# Patient Record
Sex: MALE | Race: BLACK OR AFRICAN AMERICAN | Employment: OTHER | URBAN - METROPOLITAN AREA
[De-identification: names, ages, dates, MRNs, and addresses within clinical notes are randomized per-mention and may not be internally consistent; named-entity substitution may affect disease eponyms.]

---

## 2018-09-01 ENCOUNTER — HOSPITAL ENCOUNTER (EMERGENCY)
Facility: HOSPITAL | Age: 53
Discharge: HOME | End: 2018-09-01
Attending: EMERGENCY MEDICINE
Payer: MEDICARE

## 2018-09-01 VITALS
TEMPERATURE: 98 F | RESPIRATION RATE: 20 BRPM | SYSTOLIC BLOOD PRESSURE: 122 MMHG | DIASTOLIC BLOOD PRESSURE: 67 MMHG | OXYGEN SATURATION: 100 % | HEART RATE: 64 BPM

## 2018-09-01 DIAGNOSIS — S01.00XA OPEN WOUND OF SCALP, UNSPECIFIED OPEN WOUND TYPE, INITIAL ENCOUNTER: Primary | ICD-10-CM

## 2018-09-01 PROCEDURE — 99283 EMERGENCY DEPT VISIT LOW MDM: CPT
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
## 2018-09-01 NOTE — ED PROVIDER NOTES
Patient Seen in: [facility] Emergency Department    History   Patient presents with:  Wound Recheck    Stated Complaint: FALL    HPI    46year old male with developmental disability, brought by his family for evaluation after the doyle noticed he Eyes: Conjunctivae and lids are normal. Right conjunctiva is not injected. Left conjunctiva is not injected. No scleral icterus. Pupils are equal.   Neck: Normal range of motion and phonation normal. Neck supple. No JVD present.    Pulmonary/Chest: Effort n Disposition and Plan     Clinical Impression:  Open wound of scalp, unspecified open wound type, initial encounter  (primary encounter diagnosis)    Disposition:  Discharge    Follow-up:  primary doctor    Schedule an appointment as soon as possible for a
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
## (undated) NOTE — ED AVS SNAPSHOT
Rachele Rush   MRN: [id number]    Department:  [facility] Emergency Department   Date of Visit:  9/1/2018           Disclosure     Insurance plans vary and the physician(s) referred by the ER may not be covered by your plan.  Please contact your within the next three months to obtain basic health screening including reassessment of your blood pressure.     IF THERE IS ANY CHANGE OR WORSENING OF YOUR CONDITION, CALL YOUR PRIMARY CARE PHYSICIAN AT ONCE OR RETURN IMMEDIATELY TO THE EMERGENCY DEPARTMEN

## (undated) NOTE — Clinical Note
Discharge instructions provided. Pt given literature and requested forms. At time of discharge, pt noted with unsteady gait and no assistive devices, family informed to use walkeer that he is to use. Sister- Mark Monsivais states he does not like using it.  Educat i